# Patient Record
Sex: FEMALE | Race: WHITE | Employment: UNEMPLOYED | URBAN - METROPOLITAN AREA
[De-identification: names, ages, dates, MRNs, and addresses within clinical notes are randomized per-mention and may not be internally consistent; named-entity substitution may affect disease eponyms.]

---

## 2023-02-13 DIAGNOSIS — G56.03 BILATERAL CARPAL TUNNEL SYNDROME: Primary | ICD-10-CM

## 2023-02-16 ENCOUNTER — PROCEDURE VISIT (OUTPATIENT)
Dept: PHYSICAL MEDICINE AND REHAB | Age: 58
End: 2023-02-16

## 2023-02-16 VITALS — WEIGHT: 150 LBS | HEIGHT: 66 IN | BODY MASS INDEX: 24.11 KG/M2

## 2023-02-16 DIAGNOSIS — G56.03 BILATERAL CARPAL TUNNEL SYNDROME: ICD-10-CM

## 2023-02-16 DIAGNOSIS — G56.03 BILATERAL CARPAL TUNNEL SYNDROME: Primary | ICD-10-CM

## 2023-02-16 NOTE — PROGRESS NOTES
Sweetie  20.  Electrodiagnostic Laboratory  *Accredited by the [facility] with exemplary status  [address] [facility] [place]  Phone: [phone]  Fax: [phone]      Date of Examination: 02/16/23  Patient Name: Randy Tolbert    Chief Complaint   Patient presents with    Extremity Pain     Pain in both hands. Right is worse than left. Waking her up in the middle of the night. 2+ years of symp. Numbness     Numbness in fingers. Extremity Weakness     Harder to open objects at times. Randy Tolbert  is a 59y.o. year old female who was seen due to complaints of numbness and pain in hands that has been present for years and started after no injury. Physical Exam: MSK: There is no joint effusion, deformity, instability, swelling, erythema or warmth. AROM is full in the extremities. Neurologic:  No focal sensorimotor deficit. Gait is normal.    Impression:     1. Bilateral carpal tunnel syndrome          Plan:   EMG is indicated to evaluate the above diagnosis. 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 See report for full details. The patient was educated about the diagnosis and the prognosis. Recommend surgical referral.     Advised patient to follow up with referring provider. Thank you for allowing me to participate in the care of your patient.       Sincerely,     Trell Sevilla DO, [facility]   Board Certified Physical Medicine and Rehabilitation

## 2023-02-16 NOTE — PATIENT INSTRUCTIONS
Electrodiagnotic Laboratory  Accredited by the [facility] with Exemplary status  TERRIE Richards D.O. [facility]  [address] [facility] [place]  Phone: [phone]  Fax: [phone]        Today you had an electrodiagnostic exam which included nerve conduction studies (NCS) and electromyography (EMG). This test evaluated the electrical activity of your nerves and muscles to help determine if you have a nerve or muscle disease. This test can help determine the location and type of a nerve or muscle problem. This will help your referring doctor diagnose your condition and determine the appropriate next step in your treatment plan. After your test:    1. There are no long lasting side effects of the test.     2. You may resume your normal activities without restrictions. 3.  Resume any medications that were stopped for the test.     4  If you have sore areas or bruising in your muscles where the needle was placed, apply a cold pack to the sore area for 15-20 minutes three to four times a day as needed for pain. The soreness should go away in about 1-2 days. 5. Your results were provided  Briefly at the end of your test and the final detailed report will be provided to your referring physician, and/or primary care physician and any other parties you requested within 1-2 days of the examination. You may wish to contact your referring provider after a few days to determine what they would like you to do next. 6.  Please call [phone] with any questions or concerns and if you develop increased body temperature/fever, swelling, tenderness, increased pain and/or drainage from the sites where the needle was placed. Thank you for choosing us for your health care needs.

## 2023-02-16 NOTE — PROGRESS NOTES
[address] [facility]  Electrodiagnostic Laboratory  *Accredited by the [address] [place] [address] with exemplary status  [address] [facility] [place]  Phone: [phone]  Fax: [phone]    Referring Provider: Hilda Dexter MD  Primary Care Physician: No primary care provider on file. Patient Name: Katelin Alexis  Patient YOB: 1965  Gender: female  BMI: Body mass index is 24.21 kg/m². Height 5' 6\" (1.676 m), weight 150 lb (68 kg). 2/16/2023    Reason for Referral: Carpal tunnel    Description of clinical problem:   Chief Complaint   Patient presents with    Extremity Pain     Pain in both hands. Right is worse than left. Waking her up in the middle of the night. 2+ years of symp. Numbness     Numbness in fingers. Extremity Weakness     Harder to open objects at times. Brief physical exam:   Sensory deficit No; Weakness No; Atrophy  No; Reflex abnormality No    Sensory NCS      Nerve / Sites Rec. Site Peak Lat PP Amp Segments Distance Velocity Temp.      ms µV  cm m/s °C   R Median - Digit II (Antidromic)      Palm Dig II 2.50 10.4 Palm - Dig II 7 41 32.3      Wrist Dig II 5.99 10.1 Wrist - Dig II 14 28 32.3   L Median - Digit II (Antidromic)      Palm Dig II 2.29 20.8 Palm - Dig II 7 42 32.3      Wrist Dig II 4.74 20.5 Wrist - Dig II 14 36 32.3   R Ulnar - Digit V (Antidromic)      Wrist Dig V 3.18 38.6 Wrist - Dig V 14 58 32.4   R Radial - Anatomical snuff box (Forearm)      Forearm Wrist 2.45 20.8 Forearm - Wrist 10 55 32.4       Motor NCS      Nerve / Sites Muscle Onset Amplitude Segments Distance Velocity Temp.     ms mV  cm m/s °C   R Median - APB      Palm APB 1.98 9.5 Palm - APB   32.4      Wrist APB 7.71 6.5 Wrist - Palm 8 14 32.4      Elbow APB 11.41 6.1 Elbow - Wrist 19 51 32.4   L Median - APB      Palm APB 1.51 9.0 Palm - APB   32.5      Wrist APB 5.21 6.2 Wrist - Palm 8 22 32.5      Elbow APB 9.11 6.0 Elbow - Wrist 19 49 32.4   R Ulnar - ADM      Wrist ADM 3. 07 9.3 Wrist - ADM 8  32.4      B. Elbow ADM 6.09 8.1 B. Elbow - Wrist 19 63 32.4      A. Elbow ADM 7.60 7.9 A. Elbow - B. Elbow 10 66 32.3       F  Wave      Nerve Fmin % F    ms %   R Median - APB 33.23 80   R Ulnar - ADM 25.36 30   L Median - APB 28.28 60       EMG      EMG Summary Table     Spontaneous MUAP Recruitment   Muscle Nerve Roots IA Fib PSW Fasc Amp Dur. PPP Pattern   R. Biceps brachii Musculocutaneous C5-C6 N None None None N N N N   R. Triceps brachii Radial C6-C8 N None None None N N N N   R. Pronator teres Median C6-C7 N None None None N N N N   R. First dorsal interosseous Ulnar C8-T1 N None None None N N N N   R. Abductor pollicis brevis Median U2-B3 N None None None N N N Reduced   L. Biceps brachii Musculocutaneous C5-C6 N None None None N N N N   L. Triceps brachii Radial C6-C8 N None None None N N N N   L. Pronator teres Median C6-C7 N None None None N N N N   L. First dorsal interosseous Ulnar C8-T1 N None None None N N N N   L. Abductor pollicis brevis Median O3-O9 N None None None N N N Reduced       Study Limitations:  none     Summary of Findings:   Nerve conduction studies: The following nerve conduction studies were abnormal: sensory studies of the right median nerve showed prolonged peak wrist latency, small amplitude and slowing across the wrist. Sensory studies of the left median nerve showed prolonged peak wrist latency, normal amplitude and slowing across the wrist. Motor studies of bilateral median nerves showed prolonged distal wrist latency, normal amplitude and slowing across the wrists. F-wave latency of the right median nerve was prolonged. All other nerve conduction studies, as listed in the table were normal in latency, amplitude and conduction velocity. Needle EMG:   Needle EMG was performed using a concentric needle. The following abnormalities were seen on needle EMG: there was reduced recruitment in bilateral abductor pollicis brevis muscles.    Otherwise, observed motor units were normal in amplitude, duration, phases and recruitment and no active denervation signs were seen. Diagnostic Interpretation: This study was abnormal.     There is electrodiagnostic evidence for right sensorimotor median mononeuropathy at or about the wrist,mixed demyelinating and axonal in nature, moderately severe in severity, without evidence of active denervation. It is chronic in duration. This is consistent with the clinical diagnosis of carpal tunnel syndrome. Prognosis for recovery of demyelinating lesions is good, poor for axonal.     There is electrodiagnostic evidence for left sensorimotor median mononeuropathy at or about the wrist, primarily demyelinating in nature, moderate in severity, without evidence of active denervation. It is chronic in duration. This is consistent with the clinical diagnosis of carpal tunnel syndrome. Prognosis for recovery of demyelinating lesions is good. Previous Study: There is not a prior study for comparison. Follow up EMG is recommended if clinically indicated. Technologist: Sara Lucas  Physician:Dodie Blackman 57, DO, [address] [place] [address]   Board Certified Physical Medicine and Rehabilitation      Nerve conduction studies and electromyography were performed according to our laboratory policies and procedures which can be provided upon request. All abnormal values are identified in the table. Laboratory normal values can also be provided upon request.       Cc: Valente Marshall MD  No primary care provider on file.

## 2025-05-12 ENCOUNTER — HOSPITAL ENCOUNTER (OUTPATIENT)
Age: 60
Discharge: HOME | End: 2025-05-12
Payer: COMMERCIAL

## 2025-05-12 LAB
ALBUMIN SERPL-MCNC: 4.1 G/DL (ref 3.5–5.2)
ALP SERPL-CCNC: 122 U/L (ref 35–104)
ALT SERPL-CCNC: 17 U/L (ref 0–32)
ANION GAP SERPL CALCULATED.3IONS-SCNC: 10 MMOL/L (ref 7–16)
AST SERPL-CCNC: 15 U/L (ref 0–31)
BACTERIA URNS QL MICRO: ABNORMAL
BASOPHILS # BLD: 0.04 K/UL (ref 0–0.2)
BASOPHILS NFR BLD: 1 % (ref 0–2)
BILIRUB SERPL-MCNC: 0.3 MG/DL (ref 0–1.2)
BILIRUB UR QL STRIP: NEGATIVE
BUN SERPL-MCNC: 15 MG/DL (ref 6–23)
CALCIUM SERPL-MCNC: 9 MG/DL (ref 8.6–10.2)
CHLORIDE SERPL-SCNC: 108 MMOL/L (ref 98–107)
CHOLEST SERPL-MCNC: 183 MG/DL
CLARITY UR: CLEAR
CO2 SERPL-SCNC: 24 MMOL/L (ref 22–29)
COLOR UR: YELLOW
CREAT SERPL-MCNC: 0.9 MG/DL (ref 0.5–1)
EOSINOPHIL # BLD: 0.22 K/UL (ref 0.05–0.5)
EOSINOPHILS RELATIVE PERCENT: 4 % (ref 0–6)
EPI CELLS #/AREA URNS HPF: ABNORMAL /HPF
ERYTHROCYTE [DISTWIDTH] IN BLOOD BY AUTOMATED COUNT: 12.6 % (ref 11.5–15)
GFR, ESTIMATED: 76 ML/MIN/1.73M2
GLUCOSE SERPL-MCNC: 111 MG/DL (ref 74–99)
GLUCOSE UR STRIP-MCNC: NEGATIVE MG/DL
HBA1C MFR BLD: 5.8 % (ref 4–5.6)
HCT VFR BLD AUTO: 43.4 % (ref 34–48)
HDLC SERPL-MCNC: 39 MG/DL
HGB BLD-MCNC: 14.4 G/DL (ref 11.5–15.5)
HGB UR QL STRIP.AUTO: NEGATIVE
IMM GRANULOCYTES # BLD AUTO: <0.03 K/UL (ref 0–0.58)
IMM GRANULOCYTES NFR BLD: 0 % (ref 0–5)
KETONES UR STRIP-MCNC: NEGATIVE MG/DL
LDLC SERPL CALC-MCNC: 109 MG/DL
LEUKOCYTE ESTERASE UR QL STRIP: ABNORMAL
LYMPHOCYTES NFR BLD: 2.26 K/UL (ref 1.5–4)
LYMPHOCYTES RELATIVE PERCENT: 39 % (ref 20–42)
MCH RBC QN AUTO: 29.9 PG (ref 26–35)
MCHC RBC AUTO-ENTMCNC: 33.2 G/DL (ref 32–34.5)
MCV RBC AUTO: 90 FL (ref 80–99.9)
MONOCYTES NFR BLD: 0.44 K/UL (ref 0.1–0.95)
MONOCYTES NFR BLD: 8 % (ref 2–12)
MUCOUS THREADS URNS QL MICRO: PRESENT
NEUTROPHILS NFR BLD: 49 % (ref 43–80)
NEUTS SEG NFR BLD: 2.88 K/UL (ref 1.8–7.3)
NITRITE UR QL STRIP: NEGATIVE
PH UR STRIP: 6 [PH] (ref 5–8)
PLATELET # BLD AUTO: 208 K/UL (ref 130–450)
PMV BLD AUTO: 10.7 FL (ref 7–12)
POTASSIUM SERPL-SCNC: 4.6 MMOL/L (ref 3.5–5)
PROT SERPL-MCNC: 6.5 G/DL (ref 6.4–8.3)
PROT UR STRIP-MCNC: NEGATIVE MG/DL
RBC # BLD AUTO: 4.82 M/UL (ref 3.5–5.5)
RBC #/AREA URNS HPF: ABNORMAL /HPF
SODIUM SERPL-SCNC: 142 MMOL/L (ref 132–146)
SP GR UR STRIP: >1.03 (ref 1–1.03)
TRIGL SERPL-MCNC: 173 MG/DL
TSH SERPL DL<=0.05 MIU/L-ACNC: 0.04 UIU/ML (ref 0.27–4.2)
UROBILINOGEN UR STRIP-ACNC: 0.2 EU/DL (ref 0–1)
VLDLC SERPL CALC-MCNC: 35 MG/DL
WBC #/AREA URNS HPF: ABNORMAL /HPF
WBC OTHER # BLD: 5.8 K/UL (ref 4.5–11.5)

## 2025-05-12 PROCEDURE — 80053 COMPREHEN METABOLIC PANEL: CPT

## 2025-05-12 PROCEDURE — 84443 ASSAY THYROID STIM HORMONE: CPT

## 2025-05-12 PROCEDURE — 83036 HEMOGLOBIN GLYCOSYLATED A1C: CPT

## 2025-05-12 PROCEDURE — 81001 URINALYSIS AUTO W/SCOPE: CPT

## 2025-05-12 PROCEDURE — 36415 COLL VENOUS BLD VENIPUNCTURE: CPT

## 2025-05-12 PROCEDURE — 80061 LIPID PANEL: CPT

## 2025-05-12 PROCEDURE — 85025 COMPLETE CBC W/AUTO DIFF WBC: CPT
